# Patient Record
Sex: MALE | Race: WHITE | NOT HISPANIC OR LATINO | URBAN - METROPOLITAN AREA
[De-identification: names, ages, dates, MRNs, and addresses within clinical notes are randomized per-mention and may not be internally consistent; named-entity substitution may affect disease eponyms.]

---

## 2023-11-05 ENCOUNTER — TELEPHONE (OUTPATIENT)
Dept: URGENT CARE | Facility: CLINIC | Age: 68
End: 2023-11-05
Payer: MEDICARE

## 2023-11-05 DIAGNOSIS — R05.9 COUGH IN ADULT: Primary | ICD-10-CM

## 2023-11-05 DIAGNOSIS — R06.2 WHEEZING: ICD-10-CM

## 2023-11-05 RX ORDER — ALBUTEROL SULFATE 90 UG/1
1 AEROSOL, METERED RESPIRATORY (INHALATION) EVERY 4 HOURS PRN
COMMUNITY
Start: 2019-11-22 | End: 2023-11-05 | Stop reason: SDUPTHER

## 2023-11-05 RX ORDER — BENZONATATE 200 MG/1
200 CAPSULE ORAL 3 TIMES DAILY PRN
COMMUNITY
Start: 2019-11-22 | End: 2023-11-05 | Stop reason: SDUPTHER

## 2023-11-05 RX ORDER — ALBUTEROL SULFATE 90 UG/1
1 AEROSOL, METERED RESPIRATORY (INHALATION) EVERY 4 HOURS PRN
Qty: 18 G | Refills: 0 | Status: SHIPPED | OUTPATIENT
Start: 2023-11-05

## 2023-11-05 RX ORDER — BENZONATATE 200 MG/1
200 CAPSULE ORAL 3 TIMES DAILY PRN
Qty: 30 CAPSULE | Refills: 0 | Status: SHIPPED | OUTPATIENT
Start: 2023-11-05 | End: 2023-11-15

## 2023-11-05 NOTE — TELEPHONE ENCOUNTER
"Pt contacted Dr. Bernal via on-call line stating that he gets a \"hard & hacky cough that wheezes when he exhales.\" Typically gets albuterol and benzonatate. Is requesting these medications. TJS gave verbal order to call these in for pt.   "

## 2023-11-20 ENCOUNTER — TELEPHONE (OUTPATIENT)
Dept: PRIMARY CARE | Facility: CLINIC | Age: 68
End: 2023-11-20
Payer: MEDICARE

## 2023-11-20 ENCOUNTER — ANCILLARY PROCEDURE (OUTPATIENT)
Dept: RADIOLOGY | Facility: CLINIC | Age: 68
End: 2023-11-20
Payer: MEDICARE

## 2023-11-20 ENCOUNTER — OFFICE VISIT (OUTPATIENT)
Dept: PRIMARY CARE | Facility: CLINIC | Age: 68
End: 2023-11-20
Payer: MEDICARE

## 2023-11-20 VITALS
HEART RATE: 62 BPM | WEIGHT: 215.2 LBS | DIASTOLIC BLOOD PRESSURE: 60 MMHG | TEMPERATURE: 98 F | HEIGHT: 72 IN | BODY MASS INDEX: 29.15 KG/M2 | OXYGEN SATURATION: 97 % | SYSTOLIC BLOOD PRESSURE: 112 MMHG

## 2023-11-20 DIAGNOSIS — J40 BRONCHITIS: Primary | ICD-10-CM

## 2023-11-20 DIAGNOSIS — R06.2 WHEEZE: ICD-10-CM

## 2023-11-20 DIAGNOSIS — L89.899 PRESSURE INJURY OF SKIN OF DORSUM OF LEFT FOOT, UNSPECIFIED INJURY STAGE: ICD-10-CM

## 2023-11-20 DIAGNOSIS — S99.922A FOOT INJURY, LEFT, INITIAL ENCOUNTER: ICD-10-CM

## 2023-11-20 DIAGNOSIS — R06.02 SOB (SHORTNESS OF BREATH): ICD-10-CM

## 2023-11-20 DIAGNOSIS — R05.9 COUGH IN ADULT: ICD-10-CM

## 2023-11-20 PROCEDURE — 71046 X-RAY EXAM CHEST 2 VIEWS: CPT | Performed by: RADIOLOGY

## 2023-11-20 PROCEDURE — 73630 X-RAY EXAM OF FOOT: CPT | Mod: LEFT SIDE | Performed by: RADIOLOGY

## 2023-11-20 PROCEDURE — 71046 X-RAY EXAM CHEST 2 VIEWS: CPT

## 2023-11-20 PROCEDURE — 1159F MED LIST DOCD IN RCRD: CPT | Performed by: FAMILY MEDICINE

## 2023-11-20 PROCEDURE — 99214 OFFICE O/P EST MOD 30 MIN: CPT | Performed by: FAMILY MEDICINE

## 2023-11-20 PROCEDURE — 73630 X-RAY EXAM OF FOOT: CPT | Mod: LT

## 2023-11-20 PROCEDURE — 1036F TOBACCO NON-USER: CPT | Performed by: FAMILY MEDICINE

## 2023-11-20 RX ORDER — ATORVASTATIN CALCIUM 80 MG/1
80 TABLET, FILM COATED ORAL DAILY
COMMUNITY

## 2023-11-20 RX ORDER — ASPIRIN 81 MG/1
81 TABLET ORAL DAILY
COMMUNITY

## 2023-11-20 RX ORDER — BENZONATATE 200 MG/1
200 CAPSULE ORAL 3 TIMES DAILY PRN
Qty: 42 CAPSULE | Refills: 0 | Status: SHIPPED | OUTPATIENT
Start: 2023-11-20 | End: 2023-12-20

## 2023-11-20 RX ORDER — PANTOPRAZOLE SODIUM 40 MG/1
TABLET, DELAYED RELEASE ORAL
COMMUNITY

## 2023-11-20 RX ORDER — AMLODIPINE BESYLATE 5 MG/1
5 TABLET ORAL DAILY
COMMUNITY

## 2023-11-20 RX ORDER — ISOSORBIDE MONONITRATE 30 MG/1
30 TABLET, EXTENDED RELEASE ORAL DAILY
COMMUNITY

## 2023-11-20 RX ORDER — FLUTICASONE PROPIONATE 110 UG/1
2 AEROSOL, METERED RESPIRATORY (INHALATION)
Qty: 12 G | Refills: 0 | Status: SHIPPED | OUTPATIENT
Start: 2023-11-20 | End: 2023-12-20

## 2023-11-20 RX ORDER — DOXYCYCLINE 100 MG/1
100 CAPSULE ORAL 2 TIMES DAILY
Qty: 14 CAPSULE | Refills: 0 | Status: SHIPPED | OUTPATIENT
Start: 2023-11-20 | End: 2023-11-27

## 2023-11-20 NOTE — PROGRESS NOTES
"Subjective   Patient ID: Austin Vigil is a 68 y.o. male who presents for Cough.    HPI C/o unproductive cough x 2 weeks. Has tried Tessalon Perles with little relief, Decongestants, cough suppressants.    Review of Systems   All other systems reviewed and are negative.      Objective   /60   Pulse 62   Temp 36.7 °C (98 °F)   Ht 1.829 m (6')   Wt 97.6 kg (215 lb 3.2 oz)   SpO2 97%   BMI 29.19 kg/m²     Physical Exam  Constitutional:       Appearance: Normal appearance.   Eyes:      Conjunctiva/sclera: Conjunctivae normal.   Cardiovascular:      Rate and Rhythm: Normal rate and regular rhythm.   Pulmonary:      Effort: Pulmonary effort is normal.      Breath sounds: Normal breath sounds.   Abdominal:      Palpations: Abdomen is soft.   Musculoskeletal:         General: Normal range of motion.   Skin:     General: Skin is warm and dry.   Neurological:      Mental Status: He is alert.   Psychiatric:         Mood and Affect: Mood normal.       Assessment/Plan Essentially normal exam with Hx of ongoing cough and bronchial Sx. He reports not feeling well, but no fever or chills. No SOB, just \"nagging cough.\" Will get Xray and start on Doxycycline. Also gave benzonatate and Flovent. If Sx not resolved in the next 5-7 days we would like to see him back for additional assessment. May stop Flovent after 2 weeks if cough has resolved.   Also, he noted foot injury while playing baseball. Exam was unremarkable. Suspect foot sprain. Will get Xray as he does play sports year round. Will have him rest/ice and advance activity as tolerated.   No follow up anticipated.         " 24-May-2017 20:48

## 2023-12-18 DIAGNOSIS — J40 BRONCHITIS: ICD-10-CM

## 2023-12-18 DIAGNOSIS — R05.9 COUGH IN ADULT: ICD-10-CM

## 2023-12-19 RX ORDER — FLUTICASONE PROPIONATE 110 UG/1
2 AEROSOL, METERED RESPIRATORY (INHALATION)
OUTPATIENT
Start: 2023-12-19 | End: 2024-01-18

## 2024-08-20 ENCOUNTER — TELEPHONE (OUTPATIENT)
Dept: PRIMARY CARE | Facility: CLINIC | Age: 69
End: 2024-08-20
Payer: MEDICARE

## 2024-08-20 DIAGNOSIS — Z12.5 SCREENING FOR PROSTATE CANCER: ICD-10-CM

## 2024-08-20 DIAGNOSIS — Z13.6 ENCOUNTER FOR LIPID SCREENING FOR CARDIOVASCULAR DISEASE: ICD-10-CM

## 2024-08-20 DIAGNOSIS — Z00.00 HEALTHCARE MAINTENANCE: ICD-10-CM

## 2024-08-20 DIAGNOSIS — Z13.220 ENCOUNTER FOR LIPID SCREENING FOR CARDIOVASCULAR DISEASE: ICD-10-CM

## 2024-08-20 DIAGNOSIS — Z13.1 SCREENING FOR DIABETES MELLITUS: ICD-10-CM

## 2024-09-25 ENCOUNTER — APPOINTMENT (OUTPATIENT)
Dept: PRIMARY CARE | Facility: CLINIC | Age: 69
End: 2024-09-25
Payer: MEDICARE

## 2024-10-10 ENCOUNTER — APPOINTMENT (OUTPATIENT)
Dept: PRIMARY CARE | Facility: CLINIC | Age: 69
End: 2024-10-10
Payer: MEDICARE

## 2024-10-10 VITALS
SYSTOLIC BLOOD PRESSURE: 122 MMHG | OXYGEN SATURATION: 96 % | TEMPERATURE: 97.5 F | WEIGHT: 223.6 LBS | HEIGHT: 72 IN | HEART RATE: 62 BPM | BODY MASS INDEX: 30.28 KG/M2 | DIASTOLIC BLOOD PRESSURE: 58 MMHG

## 2024-10-10 DIAGNOSIS — Z00.00 ROUTINE GENERAL MEDICAL EXAMINATION AT A HEALTH CARE FACILITY: Primary | ICD-10-CM

## 2024-10-10 PROCEDURE — 1123F ACP DISCUSS/DSCN MKR DOCD: CPT | Performed by: FAMILY MEDICINE

## 2024-10-10 PROCEDURE — 1036F TOBACCO NON-USER: CPT | Performed by: FAMILY MEDICINE

## 2024-10-10 PROCEDURE — 1158F ADVNC CARE PLAN TLK DOCD: CPT | Performed by: FAMILY MEDICINE

## 2024-10-10 PROCEDURE — 1170F FXNL STATUS ASSESSED: CPT | Performed by: FAMILY MEDICINE

## 2024-10-10 PROCEDURE — G0439 PPPS, SUBSEQ VISIT: HCPCS | Performed by: FAMILY MEDICINE

## 2024-10-10 PROCEDURE — 1159F MED LIST DOCD IN RCRD: CPT | Performed by: FAMILY MEDICINE

## 2024-10-10 PROCEDURE — 3008F BODY MASS INDEX DOCD: CPT | Performed by: FAMILY MEDICINE

## 2024-10-10 RX ORDER — MONTELUKAST SODIUM 4 MG/1
1 TABLET, CHEWABLE ORAL 2 TIMES DAILY
COMMUNITY

## 2024-10-10 ASSESSMENT — ACTIVITIES OF DAILY LIVING (ADL)
GROCERY_SHOPPING: INDEPENDENT
DOING_HOUSEWORK: INDEPENDENT
DRESSING: INDEPENDENT
BATHING: INDEPENDENT
TAKING_MEDICATION: INDEPENDENT
MANAGING_FINANCES: INDEPENDENT

## 2024-10-10 ASSESSMENT — ENCOUNTER SYMPTOMS
OCCASIONAL FEELINGS OF UNSTEADINESS: 0
LOSS OF SENSATION IN FEET: 0
DEPRESSION: 0

## 2024-10-10 ASSESSMENT — PATIENT HEALTH QUESTIONNAIRE - PHQ9
SUM OF ALL RESPONSES TO PHQ9 QUESTIONS 1 AND 2: 0
2. FEELING DOWN, DEPRESSED OR HOPELESS: NOT AT ALL
1. LITTLE INTEREST OR PLEASURE IN DOING THINGS: NOT AT ALL

## 2024-10-10 NOTE — PROGRESS NOTES
Subjective   Reason for Visit: Austin Vigil is an 69 y.o. male here for a Medicare Wellness visit.          Reviewed all medications by prescribing practitioner or clinical pharmacist (such as prescriptions, OTCs, herbal therapies and supplements) and documented in the medical record.    HPI  Medicare annual wellness exam. Mother had colon cancer. Denies family history of prostate cancer.     Last colonoscopy 12/07/2021 with a 5 year recall    Labs completed on 10/2/24    Last vaccines unknown and patient declines today.     Patent declines Hepatitis screening.       Patient Care Team:  Austin Bernal DO as PCP - General  Austin Bernal DO as PCP - [employer] PCP     Review of Systems   All other systems reviewed and are negative.      Objective   Vitals:  /58 (BP Location: Left arm, Patient Position: Sitting, BP Cuff Size: Adult)   Pulse 62   Temp 36.4 °C (97.5 °F) (Oral)   Ht 1.829 m (6')   Wt 101 kg (223 lb 9.6 oz)   SpO2 96%   BMI 30.33 kg/m²       Physical Exam  Constitutional:       Appearance: Normal appearance.   Eyes:      Conjunctiva/sclera: Conjunctivae normal.   Cardiovascular:      Rate and Rhythm: Normal rate and regular rhythm.   Pulmonary:      Effort: Pulmonary effort is normal.      Breath sounds: Normal breath sounds.   Abdominal:      Palpations: Abdomen is soft.   Musculoskeletal:         General: Normal range of motion.   Skin:     General: Skin is warm and dry.   Neurological:      Mental Status: He is alert.   Psychiatric:         Mood and Affect: Mood normal.       Assessment & Plan  Routine general medical examination at a health care facility  Reviewed labs with pt -- no issues.  Normal exam  Repeat 1 year